# Patient Record
Sex: FEMALE | Race: WHITE
[De-identification: names, ages, dates, MRNs, and addresses within clinical notes are randomized per-mention and may not be internally consistent; named-entity substitution may affect disease eponyms.]

---

## 2020-03-13 ENCOUNTER — HOSPITAL ENCOUNTER (EMERGENCY)
Dept: HOSPITAL 97 - ER | Age: 30
LOS: 1 days | Discharge: HOME | End: 2020-03-14
Payer: COMMERCIAL

## 2020-03-13 DIAGNOSIS — F17.210: ICD-10-CM

## 2020-03-13 DIAGNOSIS — L04.3: Primary | ICD-10-CM

## 2020-03-13 DIAGNOSIS — Z88.6: ICD-10-CM

## 2020-03-13 PROCEDURE — 93971 EXTREMITY STUDY: CPT

## 2020-03-13 PROCEDURE — 99283 EMERGENCY DEPT VISIT LOW MDM: CPT

## 2020-03-13 PROCEDURE — 81025 URINE PREGNANCY TEST: CPT

## 2020-03-13 PROCEDURE — 81003 URINALYSIS AUTO W/O SCOPE: CPT

## 2020-03-13 NOTE — XMS REPORT
Patient Summary Document

 Created on:2020



Patient:HERNANDO SAN

Sex:Female

:1990

External Reference #:[id number]





Demographics







 Address  [address] [place] [id number]

 

 Home Phone  [phone]

 

 Email Address  [email or web address]

 

 Preferred Language  Unknown

 

 Marital Status  Unknown

 

 Holiness Affiliation  Unknown

 

 Race  Unknown

 

 Additional Race(s)  Unavailable

 

 Ethnic Group  Unknown









Author







 Organization  [facility]

 

 Address  [address] [place] [address] [address]. [address]. [address] [place] [id number]

 

 Phone  [phone]









Care Team Providers







 Name  Role  Phone

 

 Unavailable  Unavailable  Unavailable









Problems

This patient has no known problems.



Allergies, Adverse Reactions, Alerts

This patient has no known allergies or adverse reactions.



Medications

This patient has no known medications.

## 2020-03-14 VITALS — SYSTOLIC BLOOD PRESSURE: 111 MMHG | DIASTOLIC BLOOD PRESSURE: 73 MMHG | TEMPERATURE: 99.7 F

## 2020-03-14 VITALS — OXYGEN SATURATION: 100 %

## 2020-03-14 NOTE — EDPHYS
Physician Documentation                                                                           

 [facility]                                                                 

Name: Angi Elliott                                                                               

Age: 29 yrs                                                                                       

Sex: Female                                                                                       

: 1990                                                                                   

MRN: [id number]                                                                                   

Arrival Date: 2020                                                                          

Time: 21:44                                                                                       

Account#: [id number]                                                                            

Bed 16                                                                                            

Private MD:                                                                                       

ED Physician Calvin Ziegler                                                                     

HPI:                                                                                              

                                                                                             

00:25 This 29 yrs old  Female presents to ER via Ambulatory with complaints of       kb  

      Fever, Knot on upper leg.                                                                   

00:26 the patient presents with a swollen area of the right femoral area. Description:        kb  

      swollen. Onset: The symptoms/episode began/occurred 3 day(s) ago. Possible cause(s):        

      unknown. Associated signs and symptoms: The patient has no apparent associated signs or     

      symptoms. Modifying factors: the symptoms are alleviated by nothing, the symptoms are       

      aggravated by pressure. Severity of symptoms: At their worst the symptoms were              

      moderate, in the emergency department the symptoms are unchanged. The patient has not       

      experienced similar symptoms in the past. The patient has not recently seen a               

      physician. Pt reports she had a tender bump in right upper thigh that started a few         

      days ago, yesterday she noticed 2 more in her groin and today she has had fever and         

      diarrhea. Denies abd pain.                                                                  

                                                                                                  

OB/GYN:                                                                                           

                                                                                             

22:02 LMP 3/13/2020                                                                           aj1 

                                                                                                  

Historical:                                                                                       

- Allergies:                                                                                      

22:02 Adhesives;                                                                              aj1 

22:02 Morphine;                                                                               aj1 

- Home Meds:                                                                                      

22:02 None [Active];                                                                          aj1 

- PMHx:                                                                                           

22:02 None;                                                                                   aj1 

                                                                                                  

- Immunization history:: Flu vaccine is not up to date.                                           

- Social history:: Smoking status: Patient reports the use of cigarette tobacco                   

  products, smokes one-half pack cigarettes per day.                                              

                                                                                                  

                                                                                                  

ROS:                                                                                              

                                                                                             

00:23 ENT: Negative for injury, pain, and discharge, Neck: Negative for injury, pain, and     kb  

      swelling, Cardiovascular: Negative for chest pain, palpitations, and edema,                 

      Respiratory: Negative for shortness of breath, cough, wheezing, and pleuritic chest         

      pain, Back: Negative for injury and pain, Skin: Negative for injury, rash, and              

      discoloration, Neuro: Negative for headache, weakness, numbness, tingling, and seizure.     

      Constitutional: Positive for fever.                                                         

      MS/extremity: Positive for pain, tenderness, of the right femoral area, "knots".            

00:24 Abdomen/GI: Positive for diarrhea, Negative for abdominal pain, nausea and vomiting.    kb  

                                                                                                  

Exam:                                                                                             

00:23 Constitutional:  This is a well developed, well nourished patient who is awake, alert,  kb  

      and in no acute distress. Head/Face:  Normocephalic, atraumatic. Neck:  Trachea             

      midline, no thyromegaly or masses palpated, and no cervical lymphadenopathy.  Supple,       

      full range of motion without nuchal rigidity, or vertebral point tenderness.  No            

      Meningismus. Chest/axilla:  Normal chest wall appearance and motion.  Nontender with no     

      deformity.  No lesions are appreciated. Cardiovascular:  Regular rate and rhythm with a     

      normal S1 and S2.  No gallops, murmurs, or rubs.  Normal PMI, no JVD.  No pulse             

      deficits. Respiratory:  Lungs have equal breath sounds bilaterally, clear to                

      auscultation and percussion.  No rales, rhonchi or wheezes noted.  No increased work of     

      breathing, no retractions or nasal flaring. Abdomen/GI:  Soft, non-tender, with normal      

      bowel sounds.  No distension or tympany.  No guarding or rebound.  No evidence of           

      tenderness throughout. Back:  No spinal tenderness.  No costovertebral tenderness.          

      Full range of motion. Skin:  Warm, dry with normal turgor.  Normal color with no            

      rashes, no lesions, and no evidence of cellulitis. Neuro:  Awake and alert, GCS 15,         

      oriented to person, place, time, and situation.  Cranial nerves II-XII grossly intact.      

      Motor strength 5/5 in all extremities.  Sensory grossly intact.  Cerebellar exam            

      normal.  Normal gait.                                                                       

00:23 Musculoskeletal/extremity: Extremities: grossly normal except: noted in the right           

      femoral area: tenderness, lymphadenopathy.                                                  

                                                                                                  

Vital Signs:                                                                                      

                                                                                             

22:02  / 77; Pulse 108; Resp 18; Temp 100.0; Pulse Ox 100% on R/A; Weight 79.38 kg (R); aj1 

      Height 6 ft. 0 in. (182.88 cm) (R);                                                         

                                                                                             

00:43  / 73; Pulse 73; Resp 20; Temp 99.7; Pulse Ox 100% on R/A; Pain 7/10;             lw1 

                                                                                             

22:02 Body Mass Index 23.73 (79.38 kg, 182.88 cm)                                             aj1 

                                                                                                  

MDM:                                                                                              

                                                                                             

23:09 Patient medically screened.                                                             kb  

                                                                                             

00:25 Data reviewed: vital signs, nurses notes. Data interpreted: Pulse oximetry: on room air kb  

      is 100 %. Interpretation: normal. Counseling: I had a detailed discussion with the          

      patient and/or guardian regarding: the historical points, exam findings, and any            

      diagnostic results supporting the discharge/admit diagnosis, lab results, radiology         

      results, the need for outpatient follow up, a family practitioner, to return to the         

      emergency department if symptoms worsen or persist or if there are any questions or         

      concerns that arise at home.                                                                

                                                                                                  

                                                                                             

23:40 Order name: Urine Dipstick--Ancillary (enter results); Complete Time: 00:11             sp  

                                                                                             

23:40 Order name: Urine Pregnancy--Ancillary (enter results); Complete Time: 00:11            sp  

                                                                                             

23:27 Order name: Urine Dipstick-Ancillary (obtain specimen); Complete Time: 00:01            kb  

                                                                                             

23:27 Order name: US Extremity Venous Unilateral Ltd                                          kb  

                                                                                                  

Administered Medications:                                                                         

No medications were administered                                                                  

                                                                                                  

                                                                                                  

Disposition:                                                                                      

03:30 Co-signature as Attending Physician, Calvin Ziegler MD I agree with the assessment and tw4 

      plan of care.                                                                               

                                                                                                  

Disposition:                                                                                      

20 00:28 Discharged to Home. Impression: Acute lymphadenitis of lower limb.                 

- Condition is Stable.                                                                            

- Discharge Instructions: Lymphadenopathy.                                                        

                                                                                                  

- Medication Reconciliation Form, Thank You Letter, Antibiotic Education, Prescription            

  Opioid Use form.                                                                                

- Follow up: Emergency Department; When: As needed; Reason: Worsening of condition.               

  Follow up: Private Physician; When: 2 - 3 days; Reason: Recheck today's complaints,             

  Continuance of care, Re-evaluation by your physician.                                           

                                                                                                  

                                                                                                  

                                                                                                  

Signatures:                                                                                       

Dispatcher [employer]                           Karoline Clifford, YOVANAC                 BLAKEP-Radha Mello, RN                     RN   aj1                                                  

Calvin Ziegler MD MD   tw4                                                  

Thai Daigle RN                    RN   lw1                                                  

                                                                                                  

Corrections: (The following items were deleted from the chart)                                    

00:25 00:23 ENT: Negative for injury, pain, and discharge, Neck: Negative for injury, pain,   kb  

      and swelling, Cardiovascular: Negative for chest pain, palpitations, and edema,             

      Respiratory: Negative for shortness of breath, cough, wheezing, and pleuritic chest         

      pain, Abdomen/GI: Negative for abdominal pain, nausea, vomiting, diarrhea, and              

      constipation, Back: Negative for injury and pain, Skin: Negative for injury, rash, and      

      discoloration, Neuro: Negative for headache, weakness, numbness, tingling, and seizure,     

      kb                                                                                          

00:53 00:28 2020 00:28 Discharged to Home. Impression: Acute lymphadenitis of lower     lw1 

      limb. Condition is Stable. Forms are Medication Reconciliation Form, Thank You Letter,      

      Antibiotic Education, Prescription Opioid Use. Follow up: Emergency Department; When:       

      As needed; Reason: Worsening of condition. Follow up: Private Physician; When: 2 - 3        

      days; Reason: Recheck today's complaints, Continuance of care, Re-evaluation by your        

      physician. kb                                                                               

                                                                                                  

**************************************************************************************************

## 2020-03-14 NOTE — RAD REPORT
EXAM DESCRIPTION:  US - Extremity Venous Uni Ltd - 3/14/2020 12:00 am

 

CLINICAL HISTORY:  Pain;Swelling

 

COMPARISON:  None.

 

TECHNIQUE:  Real-time sonographic evaluation of the right lower extremity deep venous systems was per
formed.

 

FINDINGS:  Normal compressibility, flow augmentation, phasic flow and spontaneous flow are identified
 in the right lower extremity common femoral, superficial femoral, popliteal and posterior tibial vei
ns. No intraluminal filling defects seen.

 

Prominent right inguinal lymph nodes are present. Assessment was limited but these are not grossly ab
normal the characteristics.

 

IMPRESSION:  No DVT in the right lower extremity.

## 2020-03-14 NOTE — ER
Nurse's Notes                                                                                     

 [facility]                                                                 

Name: Angi Elliott                                                                               

Age: 29 yrs                                                                                       

Sex: Female                                                                                       

: 1990                                                                                   

MRN: [id number]                                                                                   

Arrival Date: 2020                                                                          

Time: 21:44                                                                                       

Account#: [id number]                                                                            

Bed 16                                                                                            

Private MD:                                                                                       

Diagnosis: Acute lymphadenitis of lower limb                                                      

                                                                                                  

Presentation:                                                                                     

                                                                                             

21:59 Chief complaint: Patient states: "A couple days ago I got this weird knot on my leg,    aj1 

      and it started to spread, I've been running fever for a couple days, I'm having             

      diarrhea also". Coronavirus screen: The patient has NOT traveled to a country currently     

      being monitored by the [facility] within the last 14 days. Ebola Screen: Patient denies travel     

      to an Ebola-affected area in the 21 days before illness onset. Initial Sepsis Screen:       

      Does the patient meet any 2 criteria? HR > 90 bpm. No. Patient's initial sepsis screen      

      is negative. Does the patient have a suspected source of infection? Yes: Skin               

      breakdown/wound. Risk Assessment: Do you want to hurt yourself or someone else? Patient     

      reports no desire to harm self or others.                                                   

21:59 Method Of Arrival: Ambulatory                                                           aj1 

21:59 Acuity: RAVINDER 4                                                                           aj1 

                                                                                                  

Triage Assessment:                                                                                

22:02 General: Appears in no apparent distress. uncomfortable, Behavior is calm, cooperative, aj1 

      appropriate for age. Pain: Pain currently is 5 out of 10 on a pain scale. Neuro: Level      

      of Consciousness is awake, alert, obeys commands, Oriented to person, place, time,          

      situation. Cardiovascular: Patient's skin is warm and dry. Respiratory: Airway is           

      patent Respiratory effort is even, unlabored, Respiratory pattern is regular,               

      symmetrical.                                                                                

                                                                                                  

OB/GYN:                                                                                           

22:02 LMP 3/13/2020                                                                           aj1 

                                                                                                  

Historical:                                                                                       

- Allergies:                                                                                      

22:02 Adhesives;                                                                              aj1 

22:02 Morphine;                                                                               aj1 

- Home Meds:                                                                                      

22:02 None [Active];                                                                          aj1 

- PMHx:                                                                                           

22:02 None;                                                                                   aj1 

                                                                                                  

- Immunization history:: Flu vaccine is not up to date.                                           

- Social history:: Smoking status: Patient reports the use of cigarette tobacco                   

  products, smokes one-half pack cigarettes per day.                                              

                                                                                                  

                                                                                                  

Screenin/14                                                                                             

00:51 Abuse screen: Denies threats or abuse. Denies injuries from another. Nutritional        lw1 

      screening: No deficits noted. Tuberculosis screening: No symptoms or risk factors           

      identified. Fall Risk None identified.                                                      

                                                                                                  

Vital Signs:                                                                                      

                                                                                             

22:02  / 77; Pulse 108; Resp 18; Temp 100.0; Pulse Ox 100% on R/A; Weight 79.38 kg (R); aj1 

      Height 6 ft. 0 in. (182.88 cm) (R);                                                         

                                                                                             

00:43  / 73; Pulse 73; Resp 20; Temp 99.7; Pulse Ox 100% on R/A; Pain 7/10;             lw1 

                                                                                             

22:02 Body Mass Index 23.73 (79.38 kg, 182.88 cm)                                             aj1 

                                                                                                  

ED Course:                                                                                        

                                                                                             

21:44 Patient arrived in ED.                                                                    

22:01 Triage completed.                                                                       aj1 

22:02 Arm band placed on Patient placed in waiting room, Patient notified of wait time.       aj1 

22:08 Karoline Steel FNP-C is PHCP.                                                        kb  

22:08 Calvin Ziegler MD is Attending Physician.                                            kb  

23:20 Thai Daigle, RN is Primary Nurse.                                                  lw1 

23:45 No provider procedures requiring assistance completed. Urine collected: clean catch     lw1 

      specimen, clear.                                                                            

                                                                                             

00:00 US Extremity Venous Unilateral Ltd In Process Unspecified.                              EDMS

00:52 Patient has correct armband on for positive identification. Call light in reach. Side   lw1 

      rails up X 1.                                                                               

00:52 Patient did not have IV access during this emergency room visit.                        lw1 

                                                                                                  

Administered Medications:                                                                         

No medications were administered                                                                  

                                                                                                  

                                                                                                  

Outcome:                                                                                          

00:28 Discharge ordered by MD.                                                                kb  

00:51 Discharged to home ambulatory.                                                          lw1 

00:51 Condition: good                                                                             

00:51 Discharge instructions given to Instructed on discharge instructions, follow up and         

      referral plans. Demonstrated understanding of instructions, follow-up care.                 

00:53 Patient left the ED.                                                                    lw1 

                                                                                                  

Signatures:                                                                                       

Dispatcher [employer]                           EDMS                                                 

Karoline Steel FNP-C FNP-Ckb Johnson, Angela RN                     RN   aj                                                  

Bisi Pierce LaDonna, RN                    RN   lw1                                                  

                                                                                                  

**************************************************************************************************